# Patient Record
Sex: FEMALE | Race: WHITE | Employment: UNEMPLOYED | URBAN - METROPOLITAN AREA
[De-identification: names, ages, dates, MRNs, and addresses within clinical notes are randomized per-mention and may not be internally consistent; named-entity substitution may affect disease eponyms.]

---

## 2024-08-05 ENCOUNTER — APPOINTMENT (OUTPATIENT)
Dept: CT IMAGING | Facility: HOSPITAL | Age: 84
End: 2024-08-05
Attending: EMERGENCY MEDICINE
Payer: OTHER MISCELLANEOUS

## 2024-08-05 ENCOUNTER — HOSPITAL ENCOUNTER (EMERGENCY)
Facility: HOSPITAL | Age: 84
Discharge: HOME | End: 2024-08-05
Attending: EMERGENCY MEDICINE
Payer: OTHER MISCELLANEOUS

## 2024-08-05 VITALS
OXYGEN SATURATION: 98 % | BODY MASS INDEX: 35.87 KG/M2 | HEART RATE: 70 BPM | TEMPERATURE: 98 F | DIASTOLIC BLOOD PRESSURE: 55 MMHG | WEIGHT: 190 LBS | RESPIRATION RATE: 24 BRPM | HEIGHT: 61 IN | SYSTOLIC BLOOD PRESSURE: 143 MMHG

## 2024-08-05 DIAGNOSIS — K80.20 GALLSTONES: ICD-10-CM

## 2024-08-05 DIAGNOSIS — N30.01 ACUTE CYSTITIS WITH HEMATURIA: Primary | ICD-10-CM

## 2024-08-05 LAB
ALBUMIN SERPL-MCNC: 4.1 G/DL (ref 3.2–4.8)
ALP LIVER SERPL-CCNC: 78 U/L
ALT SERPL-CCNC: <7 U/L
ANION GAP SERPL CALC-SCNC: 9 MMOL/L (ref 0–18)
AST SERPL-CCNC: 15 U/L (ref ?–34)
BASOPHILS # BLD AUTO: 0.03 X10(3) UL (ref 0–0.2)
BASOPHILS NFR BLD AUTO: 0.2 %
BILIRUB DIRECT SERPL-MCNC: <0.1 MG/DL (ref ?–0.3)
BILIRUB SERPL-MCNC: 0.4 MG/DL (ref 0.2–1.1)
BILIRUB UR QL: NEGATIVE
BUN BLD-MCNC: 17 MG/DL (ref 9–23)
BUN/CREAT SERPL: 18.9 (ref 10–20)
CALCIUM BLD-MCNC: 10 MG/DL (ref 8.7–10.4)
CHLORIDE SERPL-SCNC: 105 MMOL/L (ref 98–112)
CO2 SERPL-SCNC: 26 MMOL/L (ref 21–32)
COLOR UR: YELLOW
CREAT BLD-MCNC: 0.9 MG/DL
DEPRECATED RDW RBC AUTO: 43.5 FL (ref 35.1–46.3)
EGFRCR SERPLBLD CKD-EPI 2021: 63 ML/MIN/1.73M2 (ref 60–?)
EOSINOPHIL # BLD AUTO: 0.16 X10(3) UL (ref 0–0.7)
EOSINOPHIL NFR BLD AUTO: 1.1 %
ERYTHROCYTE [DISTWIDTH] IN BLOOD BY AUTOMATED COUNT: 14.5 % (ref 11–15)
GLUCOSE BLD-MCNC: 107 MG/DL (ref 70–99)
GLUCOSE UR-MCNC: NORMAL MG/DL
HCT VFR BLD AUTO: 43 %
HGB BLD-MCNC: 14.8 G/DL
IMM GRANULOCYTES # BLD AUTO: 0.07 X10(3) UL (ref 0–1)
IMM GRANULOCYTES NFR BLD: 0.5 %
LEUKOCYTE ESTERASE UR QL STRIP.AUTO: 500
LYMPHOCYTES # BLD AUTO: 1.48 X10(3) UL (ref 1–4)
LYMPHOCYTES NFR BLD AUTO: 10.4 %
MCH RBC QN AUTO: 28.5 PG (ref 26–34)
MCHC RBC AUTO-ENTMCNC: 34.4 G/DL (ref 31–37)
MCV RBC AUTO: 82.7 FL
MONOCYTES # BLD AUTO: 1.1 X10(3) UL (ref 0.1–1)
MONOCYTES NFR BLD AUTO: 7.8 %
NEUTROPHILS # BLD AUTO: 11.35 X10 (3) UL (ref 1.5–7.7)
NEUTROPHILS # BLD AUTO: 11.35 X10(3) UL (ref 1.5–7.7)
NEUTROPHILS NFR BLD AUTO: 80 %
NITRITE UR QL STRIP.AUTO: NEGATIVE
OSMOLALITY SERPL CALC.SUM OF ELEC: 292 MOSM/KG (ref 275–295)
PH UR: 5.5 [PH] (ref 5–8)
PLATELET # BLD AUTO: 280 10(3)UL (ref 150–450)
POTASSIUM SERPL-SCNC: 4.8 MMOL/L (ref 3.5–5.1)
PROT SERPL-MCNC: 6.9 G/DL (ref 5.7–8.2)
PROT UR-MCNC: 100 MG/DL
RBC # BLD AUTO: 5.2 X10(6)UL
RBC #/AREA URNS AUTO: >10 /HPF
SODIUM SERPL-SCNC: 140 MMOL/L (ref 136–145)
SP GR UR STRIP: 1.02 (ref 1–1.03)
UROBILINOGEN UR STRIP-ACNC: NORMAL
WBC # BLD AUTO: 14.2 X10(3) UL (ref 4–11)
WBC #/AREA URNS AUTO: >50 /HPF

## 2024-08-05 PROCEDURE — 80076 HEPATIC FUNCTION PANEL: CPT | Performed by: EMERGENCY MEDICINE

## 2024-08-05 PROCEDURE — 82272 OCCULT BLD FECES 1-3 TESTS: CPT

## 2024-08-05 PROCEDURE — 87088 URINE BACTERIA CULTURE: CPT | Performed by: EMERGENCY MEDICINE

## 2024-08-05 PROCEDURE — 36415 COLL VENOUS BLD VENIPUNCTURE: CPT

## 2024-08-05 PROCEDURE — 85025 COMPLETE CBC W/AUTO DIFF WBC: CPT | Performed by: EMERGENCY MEDICINE

## 2024-08-05 PROCEDURE — 99284 EMERGENCY DEPT VISIT MOD MDM: CPT

## 2024-08-05 PROCEDURE — 87186 SC STD MICRODIL/AGAR DIL: CPT | Performed by: EMERGENCY MEDICINE

## 2024-08-05 PROCEDURE — 74176 CT ABD & PELVIS W/O CONTRAST: CPT | Performed by: EMERGENCY MEDICINE

## 2024-08-05 PROCEDURE — 80048 BASIC METABOLIC PNL TOTAL CA: CPT | Performed by: EMERGENCY MEDICINE

## 2024-08-05 PROCEDURE — 81001 URINALYSIS AUTO W/SCOPE: CPT | Performed by: EMERGENCY MEDICINE

## 2024-08-05 PROCEDURE — 87086 URINE CULTURE/COLONY COUNT: CPT | Performed by: EMERGENCY MEDICINE

## 2024-08-05 RX ORDER — CEPHALEXIN 500 MG/1
500 CAPSULE ORAL ONCE
Status: COMPLETED | OUTPATIENT
Start: 2024-08-05 | End: 2024-08-05

## 2024-08-05 RX ORDER — CEPHALEXIN 500 MG/1
500 CAPSULE ORAL 4 TIMES DAILY
Qty: 40 CAPSULE | Refills: 0 | Status: SHIPPED | OUTPATIENT
Start: 2024-08-05 | End: 2024-08-15

## 2024-08-06 NOTE — DISCHARGE INSTRUCTIONS
Thank you for seeking care at [facility] Emergency Department.  You have been seen and evaluated.We reviewed the results from your visit in the emergency department. You were found to have an urinary tract infection.  Please read the instructions provided, and if given prescriptions, take as instructed.     Remember, your care process does not end after your visit today. Please follow-up with your doctor within 1-2 days for a follow-up check to ensure you are  improving, to see if you need any further evaluation/testing, or to evaluate for any alternate diagnoses.     You should return to the emergency department if you develop severe nausea and vomiting AND are unable to keep liquids down, if you develop severe back/flank or stomach pain, or if your symptoms are not clearly improving at home.     We hope you feel better.

## 2024-08-06 NOTE — ED PROVIDER NOTES
Patient Seen in: [facility] Emergency Department    History     Chief Complaint   Patient presents with    Bleeding     Stated Complaint: blood in urine    HPI    Patient is a delightful 84F hx of remote cervical cancer, Parkinson's disease presents with pt's daughter for evaluation of blood seen in her diaper earlier today. Daughter states pt lives at assisted living today and today the CNA saw blood on the pt's underwear twice today. They are unsure if this is vaginal or from urine. They have not seen frankly bloody or tarry stool. Pt otherwise without any fever, vomiting, diarrhea, abdominal pain, flank pain, chest pain, SOB, leg swelling or other complaints. Daughter sts pt does have hx of UTI in the past and pt has seemed a little more confused than her baseline the past couple of days and wondering if it could be a UTI. No recent fall or trauma.     Past Medical History:    Cervical cancer (HCC)    Dementia (HCC)    Parkinson disease (HCC)       History reviewed. No pertinent surgical history.         No family history on file.    Social History     Socioeconomic History    Marital status: Single   Tobacco Use    Smoking status: Former     Types: Cigarettes    Smokeless tobacco: Never     Social Determinants of Health      Received from [facility]       Review of Systems    Positive for stated complaint: blood in urine  Other systems are as noted in HPI.  Constitutional and vital signs reviewed.      All other systems reviewed and negative except as noted above.    PSFH elements reviewed from today and agreed except as otherwise stated in HPI.    Physical Exam     ED Triage Vitals [08/05/24 1606]   /69   Pulse 92   Resp 18   Temp 98 °F (36.7 °C)   Temp src Temporal   SpO2 94 %   O2 Device None (Room air)       Current:/55   Pulse 70   Temp 98 °F (36.7 °C) (Temporal)   Resp 24   Ht 154.9 cm (5' 1\")   Wt 86.2 kg   SpO2 98%   BMI 35.90 kg/m²   GENERAL: alert, no  distress  SKIN: good skin turgor, no rashes  HEENT: atraumatic, normocephalic, ears and throat are clear  EYES: sclera non icteric, conjunctiva non-injected  NECK: supple  LUNGS:no resp distress  CARDIO:regular rate and rhythm no murmur   EXTREMITIES: no cyanosis, clubbing or edema  GI: soft, nontender throughout, no guarding, no CVAT bilaterally    (exam conducted with RN cartside throughout): guaiac negative brown stool. Limited pelvic exam with speculum shows no vaginal bleeding or vulvar mass.   NEURO: awake, alert, oriented to self (per daughter this is baseline for patient), follows commands, no focal deficits noted     ED Course     Labs Reviewed   BASIC METABOLIC PANEL (8) - Abnormal; Notable for the following components:       Result Value    Glucose 107 (*)     All other components within normal limits   HEPATIC FUNCTION PANEL (7) - Abnormal; Notable for the following components:    ALT <7 (*)     All other components within normal limits   URINALYSIS WITH CULTURE REFLEX - Abnormal; Notable for the following components:    Clarity Urine Ex.Turbid (*)     Ketones Urine Trace (*)     Blood Urine 3+ (*)     Protein Urine 100 (*)     Leukocyte Esterase Urine 500 (*)     WBC Urine >50 (*)     RBC Urine >10 (*)     Squamous Epi. Cells Few (*)     Transitional Cells Few (*)     All other components within normal limits   CBC W/ DIFFERENTIAL - Abnormal; Notable for the following components:    WBC 14.2 (*)     Neutrophil Absolute Prelim 11.35 (*)     Neutrophil Absolute 11.35 (*)     Monocyte Absolute 1.10 (*)     All other components within normal limits   CBC WITH DIFFERENTIAL WITH PLATELET    Narrative:     The following orders were created for panel order CBC With Differential With Platelet.  Procedure                               Abnormality         Status                     ---------                               -----------         ------                     CBC W/ DIFFERENTIAL[[id number]]           Abnormal            Final result                 Please view results for these tests on the individual orders.   URINE CULTURE, ROUTINE               MDM      This patient presents with possible hematuria. On exam guaiac negative. No obvious bleeding on pelvic exam.     Differential diagnoses considered includes, but is not limited to:   Uti  Pyelonephritis  Kidney stone  Bladder malignancy  Less likely recurrent cervical cancer though considered     Will obtain the following tests: cbc, cmp, UA, plain CTAP   Will administer the following medications/therapies: n/a for now pending workup     Please see ED course for my independent review of these tests/imaging results.      ED Course as of 08/06/24 0233  ------------------------------------------------------------  Time: 08/05 1835  Value: WBC(!): 14.2  Comment: (Reviewed)  ------------------------------------------------------------  Time: 08/05 1835  Value: Hemoglobin: 14.8  Comment: (Reviewed)  ------------------------------------------------------------  Time: 08/05 1909  Comment: bmp hepatic panel unremarkable   ------------------------------------------------------------  Time: 08/05 2005  Value: Urinalysis with Culture Reflex(!)  Comment: Ua c/w uti. Given leukocytosis clinically likely pyelonephritis. Pending CTAP anticipate DC   ------------------------------------------------------------  Time: 08/05 2046  Value: CT ABDOMEN+PELVIS KIDNEYSTONE 2D RNDR(NO IV,NO ORAL)(CPT=74176)  Comment: Impression  CONCLUSION:     Acute cystitis.     No radiopaque renal calculus or hydronephrosis.     Diverticulosis without diverticulitis.     Remote bilateral sacral, iliac and bilateral pubic rami fractures.     Cholelithiasis without CT evidence of acute cholecystitis.     Multiple other incidental findings as described in the body of the report.      Daughter updated with results. Given leukocytosis and hematuria will give 10d course of cephalexin. Discussed incidental  findings on plain CTAP with daughter. Counseled on f/u with urology as outpatient within 7 days. Counseled extensively on strict return precautions. Pt and daughter verbalized understanding daughter is comfortable with DC plan.               Disposition and Plan     Clinical Impression:  1. Acute cystitis with hematuria    2. Gallstones        Disposition:  Discharge    Follow-up:  Indra Aburto  [address] [place] [address]  [phone]    Schedule an appointment as soon as possible for a visit in 2 day(s)      [facility] Emergency Department  [address] [id number] [phone]  Go to  If symptoms worsen, immediately    [facility]  [address], [address]  [facility] [facility] [address] [phone]  Schedule an appointment as soon as possible for a visit in 1 week(s)      Yelitza Pillai MD  [address]  [facility] [id number] [phone]    Schedule an appointment as soon as possible for a visit in 1 week(s)  As needed regarding gallstones      Medications Prescribed:  Discharge Medication List as of 8/5/2024  8:56 PM        START taking these medications    Details   cephalexin 500 MG Oral Cap Take 1 capsule (500 mg total) by mouth 4 (four) times daily for 10 days., Print, Disp-40 capsule, R-0             Trina Shannon DO  Attending Physician  Emergency Medicine

## 2024-08-07 NOTE — PROGRESS NOTES
ED Culture Callback Results Review    Pharmacist reviewed culture results from ED visit .    Final urine culture positive for P. mirabilis. Patient was prescribed Cephalexin (Keflex) on discharge. Current therapy is appropriate based on reported susceptibilities. No further intervention required at this time.      Joaquin Julien, Charanjit  Emergency Medicine Pharmacist Specialist  08/07/24; 1:02 PM